# Patient Record
Sex: MALE | Race: WHITE | NOT HISPANIC OR LATINO | Employment: FULL TIME | URBAN - METROPOLITAN AREA
[De-identification: names, ages, dates, MRNs, and addresses within clinical notes are randomized per-mention and may not be internally consistent; named-entity substitution may affect disease eponyms.]

---

## 2024-05-19 ENCOUNTER — OFFICE VISIT (OUTPATIENT)
Dept: URGENT CARE | Facility: URGENT CARE | Age: 24
End: 2024-05-19
Payer: COMMERCIAL

## 2024-05-19 ENCOUNTER — ANCILLARY PROCEDURE (OUTPATIENT)
Dept: GENERAL RADIOLOGY | Facility: CLINIC | Age: 24
End: 2024-05-19
Attending: FAMILY MEDICINE
Payer: COMMERCIAL

## 2024-05-19 VITALS
BODY MASS INDEX: 23.4 KG/M2 | OXYGEN SATURATION: 98 % | RESPIRATION RATE: 16 BRPM | TEMPERATURE: 98.4 F | DIASTOLIC BLOOD PRESSURE: 70 MMHG | HEIGHT: 69 IN | WEIGHT: 158 LBS | SYSTOLIC BLOOD PRESSURE: 122 MMHG | HEART RATE: 68 BPM

## 2024-05-19 DIAGNOSIS — S60.511A ABRASION OF RIGHT HAND, INITIAL ENCOUNTER: ICD-10-CM

## 2024-05-19 DIAGNOSIS — S63.91XA SPRAIN OF RIGHT HAND, INITIAL ENCOUNTER: ICD-10-CM

## 2024-05-19 DIAGNOSIS — M79.641 PAIN OF RIGHT HAND: Primary | ICD-10-CM

## 2024-05-19 PROCEDURE — 99203 OFFICE O/P NEW LOW 30 MIN: CPT | Performed by: FAMILY MEDICINE

## 2024-05-19 PROCEDURE — 73130 X-RAY EXAM OF HAND: CPT | Mod: TC | Performed by: RADIOLOGY

## 2024-05-19 NOTE — PATIENT INSTRUCTIONS
Wrist/ hand splint as needed      Ice/ over the counter meds as needed    Follow up if not resolving

## 2024-05-19 NOTE — PROGRESS NOTES
Rob Stephens is a 23 year old male who comes in today for hand injury    Running home yesterday and fell on side walk      Pain same today as last night    No history of hand problems      Works  construction          On exam patient has large bruise present on flexor aspect of right wrist    He is most tender over proximal palm but not wrist proper    A little tender over proximal dorsum of hand    He has some tenderness over base of thumb also    A couple very small abrasions    Only mild swelling    Has fairly good range of motion of wrist/ hand/ fingers    Xray hand normal to my reading and also later radiology reading     ASSESSMENT / PLAN:  (M79.641) Pain of right hand  (primary encounter diagnosis)  Comment: xray as above   Plan: XR Hand Right G/E 3 Views             (S60.511A) Abrasion of right hand, initial encounter  Comment: follow up prn symptoms   Plan: as above     (S63.91XA) Sprain of right hand, initial encounter  Comment: use over the counter wrist splint briefly as needed   Plan: follow up if symptoms not resolving     Be seen promptly if symptoms acutely worsen       I reviewed the patient's medications, allergies, medical history, family history, and social history.    Laz Montgomery MD

## 2024-10-06 ENCOUNTER — HEALTH MAINTENANCE LETTER (OUTPATIENT)
Age: 24
End: 2024-10-06

## 2024-10-14 ENCOUNTER — OFFICE VISIT (OUTPATIENT)
Dept: URGENT CARE | Facility: URGENT CARE | Age: 24
End: 2024-10-14
Payer: COMMERCIAL

## 2024-10-14 ENCOUNTER — ANCILLARY PROCEDURE (OUTPATIENT)
Dept: GENERAL RADIOLOGY | Facility: CLINIC | Age: 24
End: 2024-10-14
Attending: PHYSICIAN ASSISTANT
Payer: COMMERCIAL

## 2024-10-14 VITALS
RESPIRATION RATE: 16 BRPM | BODY MASS INDEX: 23.33 KG/M2 | HEART RATE: 54 BPM | OXYGEN SATURATION: 100 % | SYSTOLIC BLOOD PRESSURE: 122 MMHG | WEIGHT: 158 LBS | DIASTOLIC BLOOD PRESSURE: 80 MMHG

## 2024-10-14 DIAGNOSIS — S99.922A INJURY OF TOE ON LEFT FOOT, INITIAL ENCOUNTER: Primary | ICD-10-CM

## 2024-10-14 DIAGNOSIS — S93.509A SPRAIN OF TOE, INITIAL ENCOUNTER: ICD-10-CM

## 2024-10-14 PROCEDURE — 73630 X-RAY EXAM OF FOOT: CPT | Mod: TC | Performed by: RADIOLOGY

## 2024-10-14 PROCEDURE — 99204 OFFICE O/P NEW MOD 45 MIN: CPT | Performed by: PHYSICIAN ASSISTANT

## 2024-10-14 NOTE — PROGRESS NOTES
SUBJECTIVE:   Rob Stephens is a 24 year old male presenting with a chief complaint of   Chief Complaint   Patient presents with    Toe Pain     L pinky toe pain after getting hit playing softball 2 weeks ago       He is an established patient of [facility].  Patient hit with soft ball 2 weeks ago.  Patient was wearing gym shoes at the time.  States he has been limping around.  No other injury.  No previous injury.    Treatment: none        Review of Systems    No past medical history on file.  No family history on file.  No current outpatient medications on file.     Social History     Tobacco Use    Smoking status: Not on file    Smokeless tobacco: Not on file   Substance Use Topics    Alcohol use: Not on file       OBJECTIVE  /80   Pulse 54   Resp 16   Wt 71.7 kg (158 lb)   SpO2 100%   BMI 23.33 kg/m      Physical Exam  Vitals reviewed.   Constitutional:       Appearance: Normal appearance. He is normal weight.   Cardiovascular:      Rate and Rhythm: Normal rate.   Musculoskeletal:      Comments: Left foot pinky:  erythema, mild edema.  Tenderness to palpation of 5th distal metatarsal.  DP present.  ROM normal.   Neurological:      General: No focal deficit present.      Mental Status: He is alert.   Psychiatric:         Mood and Affect: Mood normal.         Labs:  Results for orders placed or performed in visit on 10/14/24 (from the past 24 hour(s))   XR Foot Left G/E 3 Views    Narrative    EXAM: XR FOOT LEFT G/E 3 VIEWS  LOCATION: [facility] [place]  DATE: 10/14/2024    INDICATION: Pain after injury  COMPARISON: None.      Impression    IMPRESSION: The left foot is negative for fracture or dislocation.       X-Ray was done, my findings are: Xrays reviewed by myself and independently interpreted.  Any significant discrepancies with official radiologic read, patient will be notified.      No fx.  ASSESSMENT:      ICD-10-CM    1. Injury of toe on left foot, initial  encounter  S99.922A XR Foot Left G/E 3 Views      2. Sprain of toe, initial encounter  S93.825A            Medical Decision Making:    Differential Diagnosis:  MS Injury Pain: sprain, fracture, and contusion    Serious Comorbid Conditions:  Adult:   reviewed    PLAN:    Tylenol/motrin prn.  May mahad tape.  Discussed reasons to seek immediate medical attention.  Additionally if no improvement or worsening in one week, may follow up with PCP and/or UC.        Followup:    If not improving or if condition worsens, follow up with your Primary Care Provider, If not improving or if conditions worsens over the next 12-24 hours, go to the Emergency Department    There are no Patient Instructions on file for this visit.